# Patient Record
Sex: MALE
[De-identification: names, ages, dates, MRNs, and addresses within clinical notes are randomized per-mention and may not be internally consistent; named-entity substitution may affect disease eponyms.]

---

## 2018-05-03 ENCOUNTER — HOSPITAL ENCOUNTER (EMERGENCY)
Dept: HOSPITAL 25 - ED | Age: 83
Discharge: HOME | End: 2018-05-03
Payer: MEDICARE

## 2018-05-03 VITALS — SYSTOLIC BLOOD PRESSURE: 179 MMHG | DIASTOLIC BLOOD PRESSURE: 82 MMHG

## 2018-05-03 DIAGNOSIS — I10: Primary | ICD-10-CM

## 2018-05-03 DIAGNOSIS — I70.8: ICD-10-CM

## 2018-05-03 DIAGNOSIS — M54.12: ICD-10-CM

## 2018-05-03 DIAGNOSIS — M54.9: ICD-10-CM

## 2018-05-03 DIAGNOSIS — Z86.79: ICD-10-CM

## 2018-05-03 DIAGNOSIS — Z87.891: ICD-10-CM

## 2018-05-03 LAB
BASOPHILS # BLD AUTO: 0.1 10^3/UL (ref 0–0.2)
EOSINOPHIL # BLD AUTO: 0 10^3/UL (ref 0–0.6)
HCT VFR BLD AUTO: 35 % (ref 42–52)
HGB BLD-MCNC: 11.8 G/DL (ref 14–18)
INR PPP/BLD: 2.54 (ref 0.77–1.02)
LYMPHOCYTES # BLD AUTO: 1.3 10^3/UL (ref 1–4.8)
MCH RBC QN AUTO: 34 PG (ref 27–31)
MCHC RBC AUTO-ENTMCNC: 34 G/DL (ref 31–36)
MCV RBC AUTO: 101 FL (ref 80–94)
MONOCYTES # BLD AUTO: 3 10^3/UL (ref 0–0.8)
NEUTROPHILS # BLD AUTO: 2.9 10^3/UL (ref 1.5–7.7)
NRBC # BLD AUTO: 0 10^3/UL
NRBC BLD QL AUTO: 0
PLATELET # BLD AUTO: 121 10^3/UL (ref 150–450)
RBC # BLD AUTO: 3.48 10^6/UL (ref 4–5.4)
WBC # BLD AUTO: 7.4 10^3/UL (ref 3.5–10.8)

## 2018-05-03 PROCEDURE — 70498 CT ANGIOGRAPHY NECK: CPT

## 2018-05-03 PROCEDURE — 80053 COMPREHEN METABOLIC PANEL: CPT

## 2018-05-03 PROCEDURE — 85610 PROTHROMBIN TIME: CPT

## 2018-05-03 PROCEDURE — 93005 ELECTROCARDIOGRAM TRACING: CPT

## 2018-05-03 PROCEDURE — 85025 COMPLETE CBC W/AUTO DIFF WBC: CPT

## 2018-05-03 PROCEDURE — 83605 ASSAY OF LACTIC ACID: CPT

## 2018-05-03 PROCEDURE — 99283 EMERGENCY DEPT VISIT LOW MDM: CPT

## 2018-05-03 PROCEDURE — 36415 COLL VENOUS BLD VENIPUNCTURE: CPT

## 2018-05-03 PROCEDURE — 85060 BLOOD SMEAR INTERPRETATION: CPT

## 2018-05-03 PROCEDURE — 84484 ASSAY OF TROPONIN QUANT: CPT

## 2018-05-03 NOTE — RAD
INDICATION: Proximal left upper excision of a arm pain in a patient with reduced blood

pressure measurements in the left arm relative to the right.



COMPARISON: Same day CTA shows at least high-grade stenosis if not out right occlusion of

the origin of the left subclavian artery.



TECHNIQUE:  Gray scale, color Doppler, and spectral analysis was performed to evaluate the

arteries of the left upper extremity.



REPORT: 



Grayscale, color-flow and spectral Doppler analysis of the right subclavian artery was

acquired for comparison. Flow velocity is 88 cm/s and there is an appropriate arterial

waveform on Doppler analysis.



The left subclavian artery exhibits velocities measuring 81 cm/s, fairly symmetric to the

right. There is broadening of the arterial waveform relative to the right. More distally

adequate arterial flow is documented throughout the length of the subclavian artery, into

the axillary artery and more distally into the brachial and forearm arteries. Throughout

the left upper extremity broadening of the waveform is seen consistent with a central

stenosis or occlusion.



The left vertebral artery is diminutive and patent but does exhibit episodes of flow

reversal.



IMPRESSION: 

1. Sonographic findings described above are consistent with high-grade stenosis versus

occlusion at the origin of the left subclavian artery as was seen on same-day CTA.

2. There is "to and fro" flow identified in the diminutive left vertebral artery, an

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

## 2018-05-03 NOTE — ED
Jose Guadalupe ASTUDILLO Jennifer, scribed for Alex Grant MD on 05/03/18 at 0715 .





Upper Extremity Pain





- HPI Summary


HPI Summary: 





The patient is an 85 year old male who presents with left arm pain and numbness 

for the past two days. The patient reports he was diagnosed 5-6 years ago with 

carotid blockage on left side that is not operable. However, two days ago, the 

same discomfort/stiffness that he felt at the time of diagnosis came back and 

worsened this morning. The patient additionally complains of back pain. He 

denies neck pain, chest pain, trouble breathing. He denies arm pain in the ED. 





- History of Current Complaint


Chief Complaint: EDGeneral


Stated Complaint: GENERAL ILLNESS


Time Seen by Provider: 05/03/18 07:06


Hx Obtained From: Patient


Mechanism Of Injury: Other - Known diagnosis of carotid blockage on left side


Onset/Duration: Started Days Ago - two days, Still Present, Worse Since - this 

morning


Timing: Constant


Severity Initially: Mild


Severity Currently: Mild


Pain Location: Arm - left arm


Character: Stiffness - discomfort/stiff


Aggravating Factor(s): Nothing


Alleviating Factor(s): Nothing


Associated Signs & Symptoms: Positive: Back Pain.  Negative: Chest Pain, SOB, 

Neck Pain





- Allergies/Home Medications


Allergies/Adverse Reactions: 


 Allergies











Allergy/AdvReac Type Severity Reaction Status Date / Time


 


aspartame Allergy  Unknown Verified 05/03/18 06:48





   Reaction  





   Details  


 


ciprofloxacin Allergy  Unknown Verified 05/03/18 06:48





   Reaction  





   Details  


 


colestipol [From Colestid] Allergy  Unknown Verified 05/03/18 06:48





   Reaction  





   Details  


 


gemfibrozil Allergy  Unknown Verified 05/03/18 06:48





   Reaction  





   Details  


 


Penicillins Allergy  Rash Verified 05/03/18 06:48


 


Statins-Hmg-Coa Reductase Allergy  Unknown Verified 05/03/18 06:48





Inhibitor   Reaction  





   Details  











Home Medications: 


 Home Medications





Aspirin EC TAB* [Ecotrin EC Low Dose 81 MG*] 81 mg PO DAILY 05/03/18 [History 

Confirmed 05/03/18]


Dutasteride (NF) [Avodart (NF)] 0.5 mg PO DAILY 05/03/18 [History Confirmed 05/ 03/18]


Metoprolol Succinate XL TAB* [Toprol XL TAB*] 25 mg PO DAILY 05/03/18 [History 

Confirmed 05/03/18]


Omega-3 Fatty Acids (Nf) [Fish Oil (NF)] 1,000 mg PO DAILY 05/03/18 [History 

Confirmed 05/03/18]


Ramipril CAP* [Altace CAP*] 5 mg PO DAILY 05/03/18 [History Confirmed 05/03/18]


Tamsulosin CAP* [Flomax CAP*] 0.4 mg PO DAILY 05/03/18 [History Confirmed 05/03/ 18]











PMH/Surg Hx/FS Hx/Imm Hx


Cardiovascular History: Reports: Hx Atrial Fibrillation, Hx Hypotension, Hx 

Hypertension


 History: Reports: Other  Problems/Disorders - prostate CA


Musculoskeletal History: Reports: Hx Arthritis, Hx Back Problems, Hx Gout


Sensory History: Reports: Hx Cataracts - removed, Hx Contacts or Glasses - 

reading, Hx Hearing Aid


Opthamlomology History: Reports: Hx Cataracts - removed, Hx Contacts or Glasses 

- reading





- Cancer History


Cancer Type, Location and Year: prostate CA- diagnosed 2013, monitoring closely 

but no chemo or radiatioin





- Surgical History


Surgery Procedure, Year, and Place: hernia repair x 2 -1975, 2003.  right 

shoulder surgery 1952.  appendectomy 1940.  back surgery 1972, 2004.  cardiac 

stent 2002.  cholecystectomy 1990.  cataract removal 2011


Infectious Disease History: No


Infectious Disease History: 


   Denies: Traveled Outside the [place] in Last 30 Days





- Family History


Known Family History: 


   Negative: Hypertension





- Social History


Alcohol Use: None


Substance Use Type: Reports: Other


Smoking Status (MU): Former Smoker


Type: Cigarettes





Review of Systems


Negative: Chest Pain


Negative: Shortness Of Breath


Musculoskeletal: Negative - neck pain


Positive: Other - left arm pain, back pain


Positive: Numbness - left arm


All Other Systems Reviewed And Are Negative: Yes





Physical Exam





- Summary


Physical Exam Summary: 





Appearance: Well appearing, no pain distress, surgical scars on abdomen and back


Skin: Reddened scabbed areas behind left ear; warm, dry, reflects adequate 

perfusion


Head/face: normal


Eyes: EOMI, SARIAH


ENT: normal


Neck: supple, non-tender


Respiratory: CTA, breath sounds present


Cardiovascular: regular rate, irregularly irregular rhythm, pulses symmetrical, 

less blood pressure in left arm than right arm 


Abdomen: non-tender, soft


Bowel Sounds: present


Musculoskeletal: no muscular tenderness, no swelling in arm, normal, strength/

ROM intact


Neuro: normal, sensory motor intact, A&Ox3


Triage Information Reviewed: Yes


Vital Signs On Initial Exam: 


 Initial Vitals











Temp Pulse Resp BP Pulse Ox


 


 97.8 F   72   20   173/66   100 


 


 05/03/18 06:42  05/03/18 06:42  05/03/18 06:42  05/03/18 06:42  05/03/18 06:42











Vital Signs Reviewed: Yes





Diagnostics





- Vital Signs


 Vital Signs











  Temp Pulse Resp BP Pulse Ox


 


 05/03/18 06:42  97.8 F  72  20  173/66  100














- Laboratory


Lab Results: 


 Lab Results











  05/03/18 05/03/18 05/03/18 Range/Units





  07:28 07:28 07:28 


 


WBC  7.4    (3.5-10.8)  10^3/ul


 


RBC  3.48 L    (4.0-5.4)  10^6/ul


 


Hgb  11.8 L    (14.0-18.0)  g/dl


 


Hct  35 L    (42-52)  %


 


MCV  101 H    (80-94)  fL


 


MCH  34 H    (27-31)  pg


 


MCHC  34    (31-36)  g/dl


 


RDW  18 H    (10.5-15)  %


 


Plt Count  121 L    (150-450)  10^3/ul


 


MPV  10.6 H    (7.4-10.4)  um3


 


Neut % (Auto)  39.4    (38-83)  %


 


Lymph % (Auto)  18.2 L    (25-47)  %


 


Mono % (Auto)  40.9 H    (0-7)  %


 


Eos % (Auto)  0.5    (0-6)  %


 


Baso % (Auto)  1.0    (0-2)  %


 


Absolute Neuts (auto)  2.9    (1.5-7.7)  10^3/ul


 


Absolute Lymphs (auto)  1.3    (1.0-4.8)  10^3/ul


 


Absolute Monos (auto)  3.0 H    (0-0.8)  10^3/ul


 


Absolute Eos (auto)  0    (0-0.6)  10^3/ul


 


Absolute Basos (auto)  0.1    (0-0.2)  10^3/ul


 


Absolute Nucleated RBC  0    10^3/ul


 


Nucleated RBC %  0    


 


Hem Pathologist Commnt  Pending    


 


INR (Anticoag Therapy)   2.54 H   (0.77-1.02)  


 


Sodium    138 L  (139-145)  mmol/L


 


Potassium    4.1  (3.5-5.0)  mmol/L


 


Chloride    108  (101-111)  mmol/L


 


Carbon Dioxide    24  (22-32)  mmol/L


 


Anion Gap    6  (2-11)  mmol/L


 


BUN    20  (6-24)  mg/dL


 


Creatinine    1.02  (0.67-1.17)  mg/dL


 


Est GFR ( Amer)    89.3  (>60)  


 


Est GFR (Non-Af Amer)    69.4  (>60)  


 


BUN/Creatinine Ratio    19.6  (8-20)  


 


Glucose    135 H  ()  mg/dL


 


Lactic Acid     (0.5-2.0)  mmol/L


 


Calcium    9.2  (8.6-10.3)  mg/dL


 


Total Bilirubin    1.00  (0.2-1.0)  mg/dL


 


AST    20  (13-39)  U/L


 


ALT    10  (7-52)  U/L


 


Alkaline Phosphatase    54  ()  U/L


 


Troponin I    0.01  (<0.04)  ng/mL


 


Total Protein    7.5  (6.4-8.9)  g/dL


 


Albumin    4.4  (3.2-5.2)  g/dL


 


Globulin    3.1  (2-4)  g/dL


 


Albumin/Globulin Ratio    1.4  (1-3)  














  05/03/18 Range/Units





  07:28 


 


WBC   (3.5-10.8)  10^3/ul


 


RBC   (4.0-5.4)  10^6/ul


 


Hgb   (14.0-18.0)  g/dl


 


Hct   (42-52)  %


 


MCV   (80-94)  fL


 


MCH   (27-31)  pg


 


MCHC   (31-36)  g/dl


 


RDW   (10.5-15)  %


 


Plt Count   (150-450)  10^3/ul


 


MPV   (7.4-10.4)  um3


 


Neut % (Auto)   (38-83)  %


 


Lymph % (Auto)   (25-47)  %


 


Mono % (Auto)   (0-7)  %


 


Eos % (Auto)   (0-6)  %


 


Baso % (Auto)   (0-2)  %


 


Absolute Neuts (auto)   (1.5-7.7)  10^3/ul


 


Absolute Lymphs (auto)   (1.0-4.8)  10^3/ul


 


Absolute Monos (auto)   (0-0.8)  10^3/ul


 


Absolute Eos (auto)   (0-0.6)  10^3/ul


 


Absolute Basos (auto)   (0-0.2)  10^3/ul


 


Absolute Nucleated RBC   10^3/ul


 


Nucleated RBC %   


 


Hem Pathologist Commnt   


 


INR (Anticoag Therapy)   (0.77-1.02)  


 


Sodium   (139-145)  mmol/L


 


Potassium   (3.5-5.0)  mmol/L


 


Chloride   (101-111)  mmol/L


 


Carbon Dioxide   (22-32)  mmol/L


 


Anion Gap   (2-11)  mmol/L


 


BUN   (6-24)  mg/dL


 


Creatinine   (0.67-1.17)  mg/dL


 


Est GFR ( Amer)   (>60)  


 


Est GFR (Non-Af Amer)   (>60)  


 


BUN/Creatinine Ratio   (8-20)  


 


Glucose   ()  mg/dL


 


Lactic Acid  1.3  (0.5-2.0)  mmol/L


 


Calcium   (8.6-10.3)  mg/dL


 


Total Bilirubin   (0.2-1.0)  mg/dL


 


AST   (13-39)  U/L


 


ALT   (7-52)  U/L


 


Alkaline Phosphatase   ()  U/L


 


Troponin I   (<0.04)  ng/mL


 


Total Protein   (6.4-8.9)  g/dL


 


Albumin   (3.2-5.2)  g/dL


 


Globulin   (2-4)  g/dL


 


Albumin/Globulin Ratio   (1-3)  











Result Diagrams: 


 05/03/18 07:28





 05/03/18 07:28


Lab Statement: Any lab studies that have been ordered have been reviewed, and 

results considered in the medical decision making process.





- CT


  ** Neck CTA


CT Interpretation: Positive (See Comments) - 1.  Suspect origin stenosis of the 

left subclavian artery. The degree of stenosis difficult to determine due to 

technical factors. 2.  Right-sided calcific plaque formation at the bifurcation 

with an estimated 70% diameter stenosis. 3.  Left-sided calcific plaque 

formation at the bifurcation with an estimated 70% diameter stenosis. 4.  

Dominant right vertebral artery. Very small caliber left vertebral artery. 

Consider follow-up carotid ultrasonography to evaluate the bifurcations and 

evaluate for the presence of subclavian steal. Dr. Grant has reviewed this 

report.


CT Interpretation Completed By: Radiologist





- EKG


  ** [id number]


EKG Rhythm: Atrial Fibrillation - 57 BPM


ST Segment: Normal


EKG Interpretation: normal axis





Course/Dx





- Course


Course Of Treatment: Pt with known subclavian art stenosis, now with increased 

pain in the L arm. Great pulses and BPs there. CTA obtained and shows stenosis. 

D/W his cardiologist for referral, would like to Dr Glaser consulted. He was, 

and came to bedside for possible stent for subclavian steal syndrome. Asked for 

art study. Pt examined by him and he feels stent would not help, that this is 

more likely radicular. Started on Mobic. To f/u with PMD, Cards. BP tx here, 

will follow that up with PMD as well.





- Diagnoses


Differential Diagnosis/HQI/PQRI: Positive: Strain, Sprain, Other - subclavian 

steal, tumor/mass, muscular pain


Provider Diagnoses: 


 Hypertension, Subclavian artery stenosis, Cervical radiculopathy








- Physician Notifications


Discussed Care of Patient With: Andie Ambrocio


Time Discussed With Above Provider: 09:52


Instructed by Provider To: Other - Dr. Ambrocio recommended I consult with Dr. Glaser. Dr. Glaser will will the patient in the ED.





- Critical Care Time


Critical Care Time: 30-74 min - CCT is EXCLUSIVE of separately billable 

procedures.





Discharge





- Sign-Out/Discharge


Documenting (check all that apply): Discharge/Admit/Transfer





- Discharge Plan


Condition: Good


Disposition: HOME


Prescriptions: 


Meloxicam [Mobic] 7.5 mg PO DAILY #30 tablet


Patient Education Materials:  Cervical Radiculopathy (ED)


Referrals: 


Anthony PINK,Jose E GRAYSON [Primary Care Provider] - 


Additional Instructions: 


Rest arm today. Ice sore area. Return with severe pain, weakness/numbness, 

worse or other concerns.


Call your doctor for follow up today.





- Billing Disposition and Condition


Condition: GOOD


Disposition: HOME





The documentation as recorded by the Jose Guadalupe estes Jennifer accurately reflects 

the service I personally performed and the decisions made by me, Alex Grant MD.

## 2018-05-03 NOTE — CONSULT
Consult


Consult: 





Date of Service: 5/3/18





Reason for Consultation: LUE pain with evidence of left SCA arterial 

insufficiency





Requesting MD: Dr. Grant, [facility]


PCP: Dr. Cruz ([place])





(Relevant) HPI: Mr. 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 

industrial saw who presented to the ER with complaints of LUE pain. He reports 

that most recently he woke up this morning with pain in his left upper arm 

posteriorly. He states he has similar episodes in the past "few months". He 

denies any subjective weakness of the LUE. He denies a history of 

unintentionally dropping items. He denies the pain worsening when he uses his 

left arm- in fact he reports the symptoms improve when he is active. He has at 

least 2 similar episodes in prior weeks that occurred when he woke up in the 

morning and one time when he was watching television. During the latter episode 

he reports a episode of temporary diplopia. When asked specifically if he was 

using his left arm during the diplopia the patient states he was sitting in his 

recliner and not doing anything with his arm. When specifically asked about 

dizziness, imbalance or double vision when utilizing his left arm, he denied 

any of those symptoms. He denies any visible discoloration of his LUE or any 

new dark foci in his nailbeds. 





The patient reports that he was referred to a "vascular doctor" years ago, but 

was told that intervening in his subclavian artery posed too much risk for 

stroke. 





The patient takes baby aspirin and Coumadin for A fib. 








PMH/Surg Hx/FS Hx/Imm Hx


Cardiovascular History: Reports: Hx Atrial Fibrillation, Hx Hypotension, Hx 

Hypertension


 History: Reports: Other  Problems/Disorders - prostate CA


Musculoskeletal History: Reports: Hx Arthritis, Hx Back Problems, Hx Gout


Sensory History: Reports: Hx Cataracts - removed, Hx Contacts or Glasses - 

reading, Hx Hearing Aid


Opthamlomology History: Reports: Hx Cataracts - removed, Hx Contacts or Glasses 

- reading





- Cancer History


Cancer Type, Location and Year: prostate CA- diagnosed , monitoring closely 

but no chemo or radiatioin





- Surgical History


Surgery Procedure, Year, and Place: hernia repair x 2 -, .  right 

shoulder surgery .  appendectomy .  back surgery , .  cardiac 

stent .  cholecystectomy .  cataract removal 


Infectious Disease History: No


Infectious Disease History: 


   Denies: Traveled Outside the [place] in Last 30 Days





- Family History


Known Family History: 


   Negative: Hypertension





- Social History


Alcohol Use: None


Substance Use Type: Reports: Other


Smoking Status (MU): Former Smoker


Type: Cigarettes





Review of Systems


Negative: Chest Pain


Negative: Shortness Of Breath


Musculoskeletal: Neck and low back pain (chronic)


All Other Systems Reviewed And Are Negative: Yes





Physical Exam





 Selected Entries











  18





  11:42


 


Temperature 98.1 F


 


Temperature Temporal Artery





Source Scan


 


Pulse Rate 72


 


Respiratory 16





Rate 


 


O2 Sat by Pulse 96





Oximetry 


 


Patient on Room Yes





Air 








Blood Pressure: Right arm 213/95, Left arm 184/91








Appearance: Well appearing, no pain distress, surgical scars on abdomen and back


Skin: warm, dry, reflects adequate perfusion


Head/face: normal


Eyes: EOMI, SARIAH


ENT: normal


Neck: supple, non-tender


Respiratory: CTAB, breath sounds present


Cardiovascular: regular rate, irregularly irregular rhythm, pulses symmetrical, 

less blood pressure in left arm than right arm 


2+ pulses palpated at RUE the bilateral CFA and pop. 1+ pulses left radial 

artery. Cannot readily locate left brachial artery pulses. 


+ bruit heard over bilateral carotid bulbs


Abdomen: non-tender, soft


Bowel Sounds: present


Musculoskeletal: no muscular tenderness, no swelling in arm, normal, strength/

ROM intact. BUE strength is 5/5. No hyperreflexia elicited at LUE. 


Neuro: normal, sensory motor intact, A&Ox3





Imaging:





* RUE Arterial duplex (reviewed by me) shows dampened waveforms at the left SCA 

relative to the right. There is "to and fro" flow in the left vertebral artery. 

Adequate flow is documented in the remaining left arm arteries. 





********************************************************************************

*********************************************************************


I personally reviewed the CTA acquired today. There is at least high grade 

stenosis if not outright occlusion of the left SCA origin and the left 

vertebral artery is diminutive. 





Patient Name:                    LARON ROSARIO                               

                                                                            

Medical Record#: [id number]


Ordering Physician: Alex Grant MD                                            

                                                                            

Acct.#: [id number]


:         1932                    Age: 85   Sex: M                    

                                                                            

Location: EMERGENCY DEPARTMENT


Exam Date: 18                                                       

                                                                            ADM 

Status: REG ER


Order Information:                                               CTA NECK


Accession Number:                                                [id number]


CPT:                                                             84575


INDICATION: Left arm claudication. Question left subclavian stenosis. 

Bidirectional left


vertebral flow on  carotid ultrasound. History of bilateral carotid 

stenoses.





COMPARISON: Carotid sonogram 2011


 


TECHNIQUE: Axial source images were acquired with coronal and sagittal 

reconstructions. CT


angiographic technique was utilized with injection of 80 mL Omnipaque 350. 





FINDINGS: Aortic arch: There are significant atherosclerotic changes of the 

visualized


aortic arch and the great vessels arising from the arch. There is limited 

evaluation of


the origin of the left subclavian largely related to artifact from contrast in 

left


brachiocephalic vein. There is believed to be a proximal stenosis the degree of 

which is


difficult to determine





Right carotid: The right common carotid artery is widely patent. There is 

moderate


calcific plaque at the bifurcation with an estimated 70% diameter stenosis. The 

remainder


of the visualized internal carotid artery to the skull base appears widely 

patent. There


is vascular tortuosity.





Left carotid:The common carotid artery appears widely patent. There is 

prominent calcific


plaque formation with an estimated 70% diameter stenosis. The remainder of the 

internal


carotid artery to the skull base appears widely patent.





Right vertebral: The right vertebral artery is dominant and appears widely 

patent.





Left vertebral: The left vertebral artery is very small in caliber and is 

traced to near


the skull base.





Source images show no evidence of mass or adenopathy within the neck. There are 

no focal


brain parenchymal abnormalities or abnormal areas of enhancement. There are 

emphysematous


changes lung apices. There is ethmoid sinusitis





IMPRESSION:


1.  Suspect origin stenosis of the left subclavian artery. The degree of 

stenosis


difficult to determine due to technical factors.


2.  Right-sided calcific plaque formation at the bifurcation with an estimated 

70%


diameter stenosis.


3.  Left-sided calcific plaque formation at the bifurcation with an estimated 70

% diameter


stenosis.


4.  Dominant right vertebral artery. Very small caliber left vertebral artery. 

Consider


follow-up carotid ultrasonography to evaluate the bifurcations and evaluate for 

the


presence of subclavian steal.





CPT II Codes: 3100F PQRS





____________________________________________________________


<Electronically signed by Luis Bennett MD in OV>  18


Dictated By: Luis Bennett MD


Dictated Date/Time: 18


Transcribed Date/Time: 18





Summary: 





Mr. Rosario is an active 85 year old man with with high grade stenosis versus 

outright occlusion at the origin of the left SCA. This is well documented on 

today's vascular imaging including CTA and duplex sonography. His clinical 

history is not specific for arterial insufficiency, but for radiculopathy 

secondary to degenerative disc disease of the cervical spine. His symptoms 

occur when he is at rest or first wakes up in the morning indicating it is 

positional and not related to LUE activity. The latter clinical presentation is 

more specific for arterial insufficiency. 





Although the left vertebral artery is diminutive and "to and fro" flow is 

documented on today's ultrasound, specific questioning does not yield a history 

consistent with Subclavian Steal Syndrome. 





For certain the patient has high grade stenosis vs. occlusion at the origin of 

the left SCA, but I do not think that is the cause Mr. Rosario's symptoms and 

revascularizing the artery with a stent is unlikely to eliminate his symptoms. 





Recommendation:


1. Continue antiplatelet and anticoagulation therapy. 


2. MRI imaging of the cervical spine to determine spinal/neuro foraminal 

stenoses. 


3. Workup with his neurologist Dr. Unger and/or neurosurgeon Dr. Feldman. 


4. The patient was given my contact information and was encouraged to contact 

our clinic if his symptoms change.

## 2018-05-03 NOTE — RAD
INDICATION: Left arm claudication. Question left subclavian stenosis. Bidirectional left

vertebral flow on 2011 carotid ultrasound. History of bilateral carotid stenoses.



COMPARISON: Carotid sonogram August 04, 2011

 

TECHNIQUE: Axial source images were acquired with coronal and sagittal reconstructions. CT

angiographic technique was utilized with injection of 80 mL Omnipaque 350. 



FINDINGS: Aortic arch: There are significant atherosclerotic changes of the visualized

aortic arch and the great vessels arising from the arch. There is limited evaluation of

the origin of the left subclavian largely related to artifact from contrast in left

brachiocephalic vein. There is believed to be a proximal stenosis the degree of which is

difficult to determine



Right carotid: The right common carotid artery is widely patent. There is moderate

calcific plaque at the bifurcation with an estimated 70% diameter stenosis. The remainder

of the visualized internal carotid artery to the skull base appears widely patent. There

is vascular tortuosity.



Left carotid:The common carotid artery appears widely patent. There is prominent calcific

plaque formation with an estimated 70% diameter stenosis. The remainder of the internal

carotid artery to the skull base appears widely patent.



Right vertebral: The right vertebral artery is dominant and appears widely patent.



Left vertebral: The left vertebral artery is very small in caliber and is traced to near

the skull base.



Source images show no evidence of mass or adenopathy within the neck. There are no focal

brain parenchymal abnormalities or abnormal areas of enhancement. There are emphysematous

changes lung apices. There is ethmoid sinusitis



IMPRESSION:

1.  Suspect origin stenosis of the left subclavian artery. The degree of stenosis

difficult to determine due to technical factors.

2.  Right-sided calcific plaque formation at the bifurcation with an estimated 70%

diameter stenosis.

3.  Left-sided calcific plaque formation at the bifurcation with an estimated 70% diameter

stenosis.

4.  Dominant right vertebral artery. Very small caliber left vertebral artery. Consider

follow-up carotid ultrasonography to evaluate the bifurcations and evaluate for the

presence of subclavian steal.



CPT II Codes: 3100F PQRS

## 2018-08-14 ENCOUNTER — HOSPITAL ENCOUNTER (OUTPATIENT)
Dept: HOSPITAL 25 - OR | Age: 83
Discharge: HOME | End: 2018-08-14
Attending: PLASTIC SURGERY
Payer: MEDICARE

## 2018-08-14 VITALS — DIASTOLIC BLOOD PRESSURE: 75 MMHG | SYSTOLIC BLOOD PRESSURE: 128 MMHG

## 2018-08-14 DIAGNOSIS — I48.91: ICD-10-CM

## 2018-08-14 DIAGNOSIS — I71.4: ICD-10-CM

## 2018-08-14 DIAGNOSIS — I25.10: ICD-10-CM

## 2018-08-14 DIAGNOSIS — Z85.46: ICD-10-CM

## 2018-08-14 DIAGNOSIS — I10: ICD-10-CM

## 2018-08-14 DIAGNOSIS — C44.311: Primary | ICD-10-CM

## 2018-08-14 DIAGNOSIS — I65.29: ICD-10-CM

## 2018-08-14 DIAGNOSIS — Z95.5: ICD-10-CM

## 2018-08-14 DIAGNOSIS — Z79.01: ICD-10-CM

## 2018-08-14 DIAGNOSIS — E78.5: ICD-10-CM

## 2018-08-14 DIAGNOSIS — Z87.891: ICD-10-CM
